# Patient Record
Sex: FEMALE | URBAN - METROPOLITAN AREA
[De-identification: names, ages, dates, MRNs, and addresses within clinical notes are randomized per-mention and may not be internally consistent; named-entity substitution may affect disease eponyms.]

---

## 2018-07-16 ENCOUNTER — APPOINTMENT (RX ONLY)
Dept: URBAN - METROPOLITAN AREA CLINIC 49 | Facility: CLINIC | Age: 28
Setting detail: DERMATOLOGY
End: 2018-07-16

## 2018-07-16 DIAGNOSIS — L30.9 DERMATITIS, UNSPECIFIED: ICD-10-CM

## 2018-07-16 DIAGNOSIS — D485 NEOPLASM OF UNCERTAIN BEHAVIOR OF SKIN: ICD-10-CM

## 2018-07-16 PROBLEM — D48.5 NEOPLASM OF UNCERTAIN BEHAVIOR OF SKIN: Status: ACTIVE | Noted: 2018-07-16

## 2018-07-16 PROBLEM — L70.0 ACNE VULGARIS: Status: ACTIVE | Noted: 2018-07-16

## 2018-07-16 PROCEDURE — 11100: CPT

## 2018-07-16 PROCEDURE — ? COUNSELING

## 2018-07-16 PROCEDURE — ? BIOPSY BY PUNCH METHOD

## 2018-07-16 PROCEDURE — ? PRESCRIPTION

## 2018-07-16 PROCEDURE — 99202 OFFICE O/P NEW SF 15 MIN: CPT | Mod: 25

## 2018-07-16 RX ORDER — PREDNISONE 10 MG/1
TABLET ORAL
Qty: 40 | Refills: 0 | Status: ERX | COMMUNITY
Start: 2018-07-16

## 2018-07-16 RX ORDER — BETAMETHASONE DIPROPIONATE 0.5 MG/G
LOTION TOPICAL
Qty: 2 | Refills: 5 | Status: ERX | COMMUNITY
Start: 2018-07-16

## 2018-07-16 RX ADMIN — PREDNISONE: 10 TABLET ORAL at 17:55

## 2018-07-16 RX ADMIN — BETAMETHASONE DIPROPIONATE: 0.5 LOTION TOPICAL at 17:30

## 2018-07-16 ASSESSMENT — LOCATION ZONE DERM: LOCATION ZONE: TRUNK

## 2018-07-16 ASSESSMENT — LOCATION SIMPLE DESCRIPTION DERM: LOCATION SIMPLE: ABDOMEN

## 2018-07-16 ASSESSMENT — LOCATION DETAILED DESCRIPTION DERM
LOCATION DETAILED: LEFT LATERAL ABDOMEN
LOCATION DETAILED: RIGHT RIB CAGE

## 2018-07-17 ENCOUNTER — RX ONLY (OUTPATIENT)
Age: 28
Setting detail: RX ONLY
End: 2018-07-17

## 2018-07-17 RX ORDER — TRIAMCINOLONE ACETONIDE 1 MG/ML
LOTION TOPICAL
Qty: 1 | Refills: 4 | Status: ERX | COMMUNITY
Start: 2018-07-17

## 2018-08-01 ENCOUNTER — APPOINTMENT (RX ONLY)
Dept: URBAN - METROPOLITAN AREA CLINIC 49 | Facility: CLINIC | Age: 28
Setting detail: DERMATOLOGY
End: 2018-08-01

## 2018-08-01 DIAGNOSIS — Z48.02 ENCOUNTER FOR REMOVAL OF SUTURES: ICD-10-CM

## 2018-08-01 PROCEDURE — ? SUTURE REMOVAL (GLOBAL PERIOD)

## 2018-08-01 PROCEDURE — 99024 POSTOP FOLLOW-UP VISIT: CPT

## 2018-08-01 ASSESSMENT — LOCATION ZONE DERM: LOCATION ZONE: TRUNK

## 2018-08-01 ASSESSMENT — LOCATION SIMPLE DESCRIPTION DERM: LOCATION SIMPLE: ABDOMEN

## 2018-08-01 ASSESSMENT — LOCATION DETAILED DESCRIPTION DERM: LOCATION DETAILED: RIGHT RIB CAGE

## 2018-08-01 NOTE — PROCEDURE: SUTURE REMOVAL (GLOBAL PERIOD)
Body Location Override (Optional - Billing Will Still Be Based On Selected Body Map Location If Applicable): Right rib cage
Detail Level: Detailed
Add 60324 Cpt? (Important Note: In 2017 The Use Of 56381 Is Being Tracked By Cms To Determine Future Global Period Reimbursement For Global Periods): yes